# Patient Record
Sex: FEMALE | Race: WHITE
[De-identification: names, ages, dates, MRNs, and addresses within clinical notes are randomized per-mention and may not be internally consistent; named-entity substitution may affect disease eponyms.]

---

## 2022-07-29 ENCOUNTER — HOSPITAL ENCOUNTER (OUTPATIENT)
Dept: HOSPITAL 47 - RADMAMWWP | Age: 67
Discharge: HOME | End: 2022-07-29
Attending: FAMILY MEDICINE
Payer: MEDICARE

## 2022-07-29 DIAGNOSIS — Z12.31: Primary | ICD-10-CM

## 2022-07-29 PROCEDURE — 77063 BREAST TOMOSYNTHESIS BI: CPT

## 2022-07-29 PROCEDURE — 77067 SCR MAMMO BI INCL CAD: CPT

## 2022-08-01 NOTE — MM
Reason for Exam: Screening  (asymptomatic). 

Baseline mammogram.





Patient History: 

Menarche at age 13. First Full-Term Pregnancy at age 20. Left ovary removed at age 41. Right ovary

removed at age 41. Hysterectomy at age 41. Postmenopausal.

Maternal aunt had breast cancer, age 47. Mother had breast cancer, age 45. 





Risk Values: 

S[employer]ia 5 year model risk: 3.1%.

NCI Lifetime model risk: 11.0%.





Prior Study Comparison: 

Patient's first Mammogram. No prior studies available for comparison. 





Tissue Density: 

There are scattered fibroglandular densities.





Findings: 

Analyzed By CAD. 

Benign vascular calcifications, left greater than right. A few benign tiny oil cyst calcifications

particularly on the left. No significant change from prior exams. 





Overall Assessment: Benign, BI-RAD 2





Management: 

Screening Mammogram of both breasts in 1 year.

1. Patient should continue monthly self breast exams.

2. A clinical breast exam by your physician is recommended on an annual basis.

3. This exam should not preclude additional follow-up of suspicious palpable abnormalities.



Electronically signed and approved by: Jonathan Cavazos M.D. Radiologist